# Patient Record
Sex: MALE | Race: WHITE | NOT HISPANIC OR LATINO | Employment: PART TIME | URBAN - NONMETROPOLITAN AREA
[De-identification: names, ages, dates, MRNs, and addresses within clinical notes are randomized per-mention and may not be internally consistent; named-entity substitution may affect disease eponyms.]

---

## 2019-05-23 ENCOUNTER — APPOINTMENT (OUTPATIENT)
Dept: OCCUPATIONAL MEDICINE | Facility: OTHER | Age: 34
End: 2019-05-23

## 2019-05-23 PROCEDURE — 99000 SPECIMEN HANDLING OFFICE-LAB: CPT

## 2021-11-17 ENCOUNTER — APPOINTMENT (OUTPATIENT)
Dept: OCCUPATIONAL MEDICINE | Facility: OTHER | Age: 36
End: 2021-11-17

## 2021-11-17 PROCEDURE — 99080 SPECIAL REPORTS OR FORMS: CPT

## 2021-11-17 PROCEDURE — 99000 SPECIMEN HANDLING OFFICE-LAB: CPT

## 2021-11-17 PROCEDURE — 99199 UNLISTED SPECIAL SVC PX/RPRT: CPT

## 2023-05-18 ENCOUNTER — HOSPITAL ENCOUNTER (EMERGENCY)
Facility: HOSPITAL | Age: 38
Discharge: HOME | End: 2023-05-18
Payer: COMMERCIAL

## 2023-05-18 VITALS
HEART RATE: 77 BPM | OXYGEN SATURATION: 96 % | TEMPERATURE: 97.2 F | RESPIRATION RATE: 18 BRPM | SYSTOLIC BLOOD PRESSURE: 153 MMHG | DIASTOLIC BLOOD PRESSURE: 76 MMHG | WEIGHT: 220 LBS

## 2023-05-18 DIAGNOSIS — Z02.89 ENCOUNTER TO OBTAIN EXCUSE FROM WORK: ICD-10-CM

## 2023-05-18 PROCEDURE — G0463 HOSPITAL OUTPT CLINIC VISIT: HCPCS

## 2023-05-18 PROCEDURE — 99213 OFFICE O/P EST LOW 20 MIN: CPT

## 2023-05-18 RX ORDER — SERTRALINE HYDROCHLORIDE 25 MG/1
1 TABLET, FILM COATED ORAL
COMMUNITY
Start: 2023-04-28

## 2023-05-18 ASSESSMENT — ENCOUNTER SYMPTOMS
FEVER: 0
COUGH: 0
APPETITE CHANGE: 0
DIZZINESS: 0
CHILLS: 0
HEADACHES: 0
CHEST TIGHTNESS: 0
ABDOMINAL PAIN: 0
VOMITING: 0
NAUSEA: 0
DIARRHEA: 0
SHORTNESS OF BREATH: 0
DYSURIA: 0
HEMATURIA: 0
ACTIVITY CHANGE: 0

## 2023-05-18 NOTE — Clinical Note
Figueroa Gill was seen and treated in our emergency department on 5/18/2023.  He may return to work on 05/22/2023.       If you have any questions or concerns, please don't hesitate to call.      Grace Lion, NP
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
## 2023-05-18 NOTE — DISCHARGE INSTRUCTIONS
You can take 1000mg of tylenol every 6 hours (max dose of 4000mg in 24 hours) and 600-800mg of ibuprofen every 8 hours. Make sure to eat with ibuprofen.     Please return with any chest pain, difficulty breathing, or concerns.
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
## 2023-05-23 ENCOUNTER — HOSPITAL ENCOUNTER (EMERGENCY)
Facility: HOSPITAL | Age: 38
Discharge: HOME | End: 2023-05-23
Attending: NURSE PRACTITIONER | Admitting: NURSE PRACTITIONER
Payer: COMMERCIAL

## 2023-05-23 ENCOUNTER — APPOINTMENT (OUTPATIENT)
Dept: GENERAL RADIOLOGY | Facility: HOSPITAL | Age: 38
End: 2023-05-23
Attending: NURSE PRACTITIONER
Payer: COMMERCIAL

## 2023-05-23 VITALS
WEIGHT: 242.06 LBS | SYSTOLIC BLOOD PRESSURE: 127 MMHG | DIASTOLIC BLOOD PRESSURE: 82 MMHG | HEART RATE: 62 BPM | TEMPERATURE: 97.3 F | RESPIRATION RATE: 16 BRPM | OXYGEN SATURATION: 97 %

## 2023-05-23 DIAGNOSIS — R07.9 CHEST PAIN OF UNKNOWN ETIOLOGY: Primary | ICD-10-CM

## 2023-05-23 PROCEDURE — 99213 OFFICE O/P EST LOW 20 MIN: CPT | Performed by: NURSE PRACTITIONER

## 2023-05-23 PROCEDURE — G0463 HOSPITAL OUTPT CLINIC VISIT: HCPCS | Mod: 25

## 2023-05-23 PROCEDURE — 71046 X-RAY EXAM CHEST 2 VIEWS: CPT

## 2023-05-23 RX ORDER — CYCLOBENZAPRINE HCL 10 MG
10 TABLET ORAL 2 TIMES DAILY PRN
Qty: 10 TABLET | Refills: 0 | Status: SHIPPED | OUTPATIENT
Start: 2023-05-23 | End: 2024-05-12

## 2023-05-23 ASSESSMENT — ENCOUNTER SYMPTOMS
FEVER: 0
SHORTNESS OF BREATH: 0
PSYCHIATRIC NEGATIVE: 1
VOMITING: 0
CHILLS: 0
NAUSEA: 0
DIARRHEA: 0
NECK STIFFNESS: 0
NECK PAIN: 0

## 2023-05-23 ASSESSMENT — ACTIVITIES OF DAILY LIVING (ADL): ADLS_ACUITY_SCORE: 35
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
## 2023-05-23 NOTE — Clinical Note
Figueroa Gill was seen and treated in our emergency department on 5/23/2023.  He may return to work on 05/26/2023.       If you have any questions or concerns, please don't hesitate to call.      Danielle Koch, NP
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
## 2023-05-23 NOTE — DISCHARGE INSTRUCTIONS
Establish care with a primary care provider and follow-up as needed  Alternate Tylenol and ibuprofen for pain  Cyclobenzaprine as needed for muscle spasms  Return to urgent care/ED with any worsening in condition or additional concerns.

## 2024-03-10 ENCOUNTER — APPOINTMENT (OUTPATIENT)
Dept: GENERAL RADIOLOGY | Facility: HOSPITAL | Age: 39
End: 2024-03-10
Attending: INTERNAL MEDICINE

## 2024-03-10 ENCOUNTER — HOSPITAL ENCOUNTER (EMERGENCY)
Facility: HOSPITAL | Age: 39
Discharge: HOME | End: 2024-03-10

## 2024-03-10 VITALS
SYSTOLIC BLOOD PRESSURE: 142 MMHG | TEMPERATURE: 98.3 F | RESPIRATION RATE: 18 BRPM | DIASTOLIC BLOOD PRESSURE: 78 MMHG | OXYGEN SATURATION: 97 % | HEART RATE: 97 BPM
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
## 2024-03-10 DIAGNOSIS — S63.502A WRIST SPRAIN, LEFT, INITIAL ENCOUNTER: ICD-10-CM

## 2024-03-10 PROCEDURE — 73110 X-RAY EXAM OF WRIST: CPT | Mod: LT

## 2024-03-10 PROCEDURE — 250N000013 HC RX MED GY IP 250 OP 250 PS 637: Performed by: INTERNAL MEDICINE

## 2024-03-10 PROCEDURE — 99283 EMERGENCY DEPT VISIT LOW MDM: CPT | Performed by: INTERNAL MEDICINE

## 2024-03-10 PROCEDURE — 73090 X-RAY EXAM OF FOREARM: CPT | Mod: LT

## 2024-03-10 PROCEDURE — 99283 EMERGENCY DEPT VISIT LOW MDM: CPT

## 2024-03-10 RX ORDER — NAPROXEN 500 MG/1
500 TABLET ORAL ONCE
Status: COMPLETED | OUTPATIENT
Start: 2024-03-10 | End: 2024-03-10

## 2024-03-10 RX ORDER — IBUPROFEN 800 MG/1
800 TABLET, FILM COATED ORAL EVERY 8 HOURS PRN
Qty: 15 TABLET | Refills: 0 | Status: SHIPPED | OUTPATIENT
Start: 2024-03-10 | End: 2024-03-15

## 2024-03-10 RX ORDER — OXYCODONE HYDROCHLORIDE 5 MG/1
5 TABLET ORAL ONCE
Status: COMPLETED | OUTPATIENT
Start: 2024-03-10 | End: 2024-03-10

## 2024-03-10 RX ADMIN — NAPROXEN 500 MG: 500 TABLET ORAL at 20:19

## 2024-03-10 RX ADMIN — OXYCODONE HYDROCHLORIDE 5 MG: 5 TABLET ORAL at 20:19

## 2024-03-10 ASSESSMENT — COLUMBIA-SUICIDE SEVERITY RATING SCALE - C-SSRS
6. HAVE YOU EVER DONE ANYTHING, STARTED TO DO ANYTHING, OR PREPARED TO DO ANYTHING TO END YOUR LIFE?: NO
2. HAVE YOU ACTUALLY HAD ANY THOUGHTS OF KILLING YOURSELF IN THE PAST MONTH?: NO
1. IN THE PAST MONTH, HAVE YOU WISHED YOU WERE DEAD OR WISHED YOU COULD GO TO SLEEP AND NOT WAKE UP?: NO

## 2024-03-10 ASSESSMENT — ACTIVITIES OF DAILY LIVING (ADL)
ADLS_ACUITY_SCORE: 35
ADLS_ACUITY_SCORE: 35

## 2024-03-11 NOTE — DISCHARGE INSTRUCTIONS
Body Location Override (Optional - Billing Will Still Be Based On Selected Body Map Location If Applicable): left nasal ala [facility]  [address] [place] [address] [place] [address]  [phone]   Detail Level: Detailed Add 96998 Cpt? (Important Note: In 2017 The Use Of 26768 Is Being Tracked By Cms To Determine Future Global Period Reimbursement For Global Periods): yes
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
## 2024-03-11 NOTE — ED NOTES
Patient placed in wrist splint and sling. Patient is discharging to home given information on wrist sprain follow up with [facility] as needed . Patient stated understanding of these instructions and is discharging by private auto.
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
## 2024-03-24 ASSESSMENT — ENCOUNTER SYMPTOMS
WHEEZING: 0
COUGH: 0
CHILLS: 0
ANAL BLEEDING: 0
SLEEP DISTURBANCE: 0
LOSS OF CONSCIOUSNESS: 0
CHEST TIGHTNESS: 0
NECK PAIN: 0
CONFUSION: 0
COLOR CHANGE: 0
FLANK PAIN: 0
VOMITING: 0
NUMBNESS: 0
DIAPHORESIS: 0
LIGHT-HEADEDNESS: 0
WEAKNESS: 0
NAUSEA: 0
DYSURIA: 0
ABDOMINAL DISTENTION: 0
FREQUENCY: 0
HEADACHES: 0
FEVER: 0
MYALGIAS: 0
BLOOD IN STOOL: 0
VOICE CHANGE: 0
PALPITATIONS: 0
BACK PAIN: 0
SHORTNESS OF BREATH: 0
DIZZINESS: 0
ABDOMINAL PAIN: 0
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
## 2024-04-23 ENCOUNTER — HOSPITAL ENCOUNTER (EMERGENCY)
Facility: HOSPITAL | Age: 39
Discharge: HOME | End: 2024-04-23

## 2024-04-23 VITALS
TEMPERATURE: 97.9 F | HEIGHT: 70 IN | WEIGHT: 240 LBS | SYSTOLIC BLOOD PRESSURE: 128 MMHG | DIASTOLIC BLOOD PRESSURE: 82 MMHG | RESPIRATION RATE: 18 BRPM | BODY MASS INDEX: 34.36 KG/M2 | OXYGEN SATURATION: 97 % | HEART RATE: 89 BPM

## 2024-04-23 DIAGNOSIS — J02.9 PHARYNGITIS: ICD-10-CM

## 2024-04-23 DIAGNOSIS — J32.9 SINUSITIS: ICD-10-CM

## 2024-04-23 LAB
FLUAV RNA SPEC QL NAA+PROBE: NEGATIVE
FLUBV RNA RESP QL NAA+PROBE: NEGATIVE
GROUP A STREP BY PCR: NOT DETECTED
RSV RNA SPEC NAA+PROBE: NEGATIVE
SARS-COV-2 RNA RESP QL NAA+PROBE: NEGATIVE

## 2024-04-23 PROCEDURE — 87637 SARSCOV2&INF A&B&RSV AMP PRB: CPT | Performed by: STUDENT IN AN ORGANIZED HEALTH CARE EDUCATION/TRAINING PROGRAM

## 2024-04-23 PROCEDURE — G0463 HOSPITAL OUTPT CLINIC VISIT: HCPCS

## 2024-04-23 PROCEDURE — 87651 STREP A DNA AMP PROBE: CPT | Performed by: STUDENT IN AN ORGANIZED HEALTH CARE EDUCATION/TRAINING PROGRAM

## 2024-04-23 PROCEDURE — 99213 OFFICE O/P EST LOW 20 MIN: CPT

## 2024-04-23 RX ORDER — FLUTICASONE PROPIONATE 50 MCG
1 SPRAY, SUSPENSION (ML) NASAL DAILY
Qty: 16 G | Refills: 0 | Status: SHIPPED | OUTPATIENT
Start: 2024-04-23 | End: 2024-04-28

## 2024-04-23 RX ORDER — IBUPROFEN 200 MG
200 TABLET ORAL EVERY 4 HOURS PRN
COMMUNITY

## 2024-04-23 ASSESSMENT — ENCOUNTER SYMPTOMS
DIARRHEA: 0
COUGH: 1
ACTIVITY CHANGE: 0
SINUS PAIN: 1
ABDOMINAL PAIN: 0
FATIGUE: 1
SINUS PRESSURE: 1
CHILLS: 1
VOMITING: 0
SORE THROAT: 1
NAUSEA: 0
APPETITE CHANGE: 0
FEVER: 0

## 2024-04-23 ASSESSMENT — COLUMBIA-SUICIDE SEVERITY RATING SCALE - C-SSRS
1. IN THE PAST MONTH, HAVE YOU WISHED YOU WERE DEAD OR WISHED YOU COULD GO TO SLEEP AND NOT WAKE UP?: NO
6. HAVE YOU EVER DONE ANYTHING, STARTED TO DO ANYTHING, OR PREPARED TO DO ANYTHING TO END YOUR LIFE?: NO
2. HAVE YOU ACTUALLY HAD ANY THOUGHTS OF KILLING YOURSELF IN THE PAST MONTH?: NO

## 2024-04-23 NOTE — ED PROVIDER NOTES
"  History     Chief Complaint   Patient presents with    Pharyngitis    Fever     HPI  Figueroa Gill is a 39 year old male who presents to the urgent care with a 6 day history of sinus pressure, congestion, sore throat, right ear pain, and chills. He denies fevers, abd pain, chest pain, shortness of breath, and n/v/d. No recent abx. Has been taking ibuprofen with minimal change, none today. Multiple ill contacts in home with similar symptoms.     Allergies:  No Known Allergies    Problem List:    There are no problems to display for this patient.       Past Medical History:    No past medical history on file.    Past Surgical History:    No past surgical history on file.    Family History:    No family history on file.    Social History:  Marital Status:  Single [1]  Social History     Tobacco Use    Smoking status: Every Day     Current packs/day: 0.50     Average packs/day: 0.5 packs/day for 15.0 years (7.5 ttl pk-yrs)     Types: Cigarettes    Smokeless tobacco: Never   Substance Use Topics    Alcohol use: No    Drug use: No        Medications:    acetaminophen-codeine (TYLENOL/CODEINE #3) 300-30 MG per tablet  cyclobenzaprine (FLEXERIL) 10 MG tablet  fluticasone (FLONASE) 50 MCG/ACT nasal spray  ibuprofen (ADVIL/MOTRIN) 200 MG tablet  sertraline (ZOLOFT) 25 MG tablet  traMADol (ULTRAM) 50 MG tablet          Review of Systems   Constitutional:  Positive for chills and fatigue. Negative for activity change, appetite change and fever.   HENT:  Positive for congestion, ear pain (right), sinus pressure, sinus pain and sore throat.    Respiratory:  Positive for cough.    Gastrointestinal:  Negative for abdominal pain, diarrhea, nausea and vomiting.   All other systems reviewed and are negative.      Physical Exam   BP: 128/82  Pulse: 89  Temp: 97.9  F (36.6  C)  Resp: 18  Height: 177.8 cm (5' 10\")  Weight: 108.9 kg (240 lb)  SpO2: 97 %      Physical Exam  Vitals and nursing note reviewed.   Constitutional:       " General: He is not in acute distress.     Appearance: He is well-developed. He is ill-appearing. He is not toxic-appearing or diaphoretic.   HENT:      Right Ear: A middle ear effusion (non purulent) is present. Tympanic membrane is not erythematous.      Left Ear: A middle ear effusion (non purulent) is present. Tympanic membrane is not erythematous.      Mouth/Throat:      Pharynx: No posterior oropharyngeal erythema.   Cardiovascular:      Rate and Rhythm: Normal rate and regular rhythm.      Heart sounds: Normal heart sounds. No murmur heard.  Pulmonary:      Effort: Pulmonary effort is normal.      Breath sounds: Normal breath sounds. No wheezing, rhonchi or rales.   Lymphadenopathy:      Cervical: Cervical adenopathy present.   Skin:     General: Skin is warm and dry.   Neurological:      Mental Status: He is alert.         ED Course        Procedures         Results for orders placed or performed during the hospital encounter of 04/23/24 (from the past 24 hour(s))   Symptomatic Influenza A/B, RSV, & SARS-CoV2 PCR (COVID-19) Nasopharyngeal    Specimen: Nasopharyngeal; Swab   Result Value Ref Range    Influenza A PCR Negative Negative    Influenza B PCR Negative Negative    RSV PCR Negative Negative    SARS CoV2 PCR Negative Negative    Narrative    Testing was performed using the Xpert Xpress CoV2/Flu/RSV Assay on the [employer] GeneXpert Instrument. This test should be ordered for the detection of SARS-CoV-2, influenza, and RSV viruses in individuals who meet clinical and/or epidemiological criteria. Test performance is unknown in asymptomatic patients. This test is for in vitro diagnostic use under the FDA EUA for laboratories certified under CLIA to perform high or moderate complexity testing. This test has not been FDA cleared or approved. A negative result does not rule out the presence of PCR inhibitors in the specimen or target RNA in concentration below the limit of detection for the assay. If only one  viral target is positive but coinfection with multiple targets is suspected, the sample should be re-tested with another FDA cleared, approved, or authorized test, if coinfection would change clinical management. This test was validated by the [facility]. These laboratories are certified under the Clinical Laboratory Improvement Amendments of 1988 (CLIA-88) as qualified to perform high complexity laboratory testing.   Group A Streptococcus PCR Throat Swab    Specimen: Throat; Swab   Result Value Ref Range    Group A strep by PCR Not Detected Not Detected    Narrative    The Xpert Xpress Strep A test, performed on the [employer], is a rapid, qualitative in vitro diagnostic test for the detection of Streptococcus pyogenes (Group A ß-hemolytic Streptococcus, Strep A) in throat swab specimens from patients with signs and symptoms of pharyngitis. The Xpert Xpress Strep A test can be used as an aid in the diagnosis of Group A Streptococcal pharyngitis. The assay is not intended to monitor treatment for Group A Streptococcus infections. The Xpert Xpress Strep A test utilizes an automated real-time polymerase chain reaction (PCR) to detect Streptococcus pyogenes DNA.       Medications - No data to display    Assessments & Plan (with Medical Decision Making)     I have reviewed the nursing notes.    I have reviewed the findings, diagnosis, plan and need for follow up with the patient.  Figueroa Gill is a 39 year old male who presents to the urgent care with a 6 day history of sinus pressure, congestion, sore throat, right ear pain, and chills. He denies fevers, abd pain, chest pain, shortness of breath, and n/v/d. No recent abx. Has been taking ibuprofen with minimal change, none today. Multiple ill contacts in home with similar symptoms.     MDM: vital signs normal, afebrile. Lungs clear, heart tones regular. He is ill appearing but non toxic in appearance. Given ill contacts and  length of illness, most likely viral in nature. Strep, covid, influenza, and RSV negative. Viral and bacterial etiologies discussed. Flonase prescribed. Discussed sudafed to help with congestion. Supportive measures and return precautions discussed. He is in agreement with plan.     (J32.9) Sinusitis, (J02.9) Pharyngitis  Plan: Flonase nasal steroid once daily for 5 days. Prescription sent to [facility] in [place].   Sudafed 60mg every 6 hours as needed to help with congestion. You can purchase this over the counter from the pharmacy.   Tylenol and ibuprofen as needed.   Push fluids.   Return with any chest pain, shortness of breath, or other concerns. Understanding verbalized.       Discharge Medication List as of 4/23/2024  9:30 AM        START taking these medications    Details   fluticasone (FLONASE) 50 MCG/ACT nasal spray Spray 1 spray into both nostrils daily for 5 days, Disp-16 g, R-0, E-Prescribe             Final diagnoses:   Sinusitis   Pharyngitis       4/23/2024   [facility]       Grace Lion NP  04/23/24 2955

## 2024-04-23 NOTE — Clinical Note
Figueroa Gill was seen and treated in our emergency department on 4/23/2024.  He may return to work on 04/24/2024.  Figueroa was in the UC today.     If you have any questions or concerns, please don't hesitate to call.      Grace Lion, NP
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
## 2024-04-23 NOTE — DISCHARGE INSTRUCTIONS
Flonase nasal steroid once daily for 5 days. Prescription sent to [facility] in [place].   Sudafed 60mg every 6 hours as needed to help with congestion. You can purchase this over the counter from the pharmacy.   Tylenol and ibuprofen as needed.   Push fluids.   Return with any chest pain, shortness of breath, or other concerns.

## 2024-05-12 ENCOUNTER — HOSPITAL ENCOUNTER (EMERGENCY)
Facility: HOSPITAL | Age: 39
Discharge: HOME | End: 2024-05-12
Attending: EMERGENCY MEDICINE | Admitting: EMERGENCY MEDICINE

## 2024-05-12 VITALS
HEART RATE: 86 BPM | OXYGEN SATURATION: 98 % | SYSTOLIC BLOOD PRESSURE: 152 MMHG | RESPIRATION RATE: 18 BRPM | TEMPERATURE: 98.9 F | DIASTOLIC BLOOD PRESSURE: 86 MMHG

## 2024-05-12 DIAGNOSIS — M25.469 TRAUMATIC EFFUSION OF KNEE JOINT: ICD-10-CM

## 2024-05-12 PROCEDURE — 250N000013 HC RX MED GY IP 250 OP 250 PS 637: Performed by: EMERGENCY MEDICINE

## 2024-05-12 PROCEDURE — 99283 EMERGENCY DEPT VISIT LOW MDM: CPT | Performed by: EMERGENCY MEDICINE

## 2024-05-12 PROCEDURE — 99283 EMERGENCY DEPT VISIT LOW MDM: CPT

## 2024-05-12 RX ORDER — CYCLOBENZAPRINE HCL 10 MG
10 TABLET ORAL 3 TIMES DAILY PRN
Qty: 15 TABLET | Refills: 0 | Status: SHIPPED | OUTPATIENT
Start: 2024-05-12

## 2024-05-12 RX ORDER — IBUPROFEN 600 MG/1
600 TABLET, FILM COATED ORAL EVERY 6 HOURS PRN
Qty: 30 TABLET | Refills: 0 | Status: SHIPPED | OUTPATIENT
Start: 2024-05-12

## 2024-05-12 RX ORDER — OXYCODONE HYDROCHLORIDE 5 MG/1
5 TABLET ORAL ONCE
Status: COMPLETED | OUTPATIENT
Start: 2024-05-12 | End: 2024-05-12

## 2024-05-12 RX ADMIN — OXYCODONE HYDROCHLORIDE 5 MG: 5 TABLET ORAL at 09:34

## 2024-05-12 ASSESSMENT — ENCOUNTER SYMPTOMS
SEIZURES: 0
ARTHRALGIAS: 1
CHILLS: 0
FEVER: 0

## 2024-05-12 ASSESSMENT — ACTIVITIES OF DAILY LIVING (ADL): ADLS_ACUITY_SCORE: 33
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
## 2024-05-12 NOTE — ED NOTES
AVS reviewed. All questions and concerns answered. Education reviewed, pt states no further questions at this time.
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
## 2024-05-12 NOTE — ED NOTES
AVS reviewed. All questions and concerns answered. Education reviewed, pt states no further questions at this time.\

## 2025-03-15 ENCOUNTER — HOSPITAL ENCOUNTER (EMERGENCY)
Facility: HOSPITAL | Age: 40
Discharge: HOME | End: 2025-03-15
Attending: NURSE PRACTITIONER
Payer: COMMERCIAL

## 2025-03-15 VITALS
SYSTOLIC BLOOD PRESSURE: 138 MMHG | OXYGEN SATURATION: 98 % | HEART RATE: 67 BPM | TEMPERATURE: 97.4 F | RESPIRATION RATE: 16 BRPM | DIASTOLIC BLOOD PRESSURE: 83 MMHG

## 2025-03-15 DIAGNOSIS — K08.89 PAIN, DENTAL: Primary | ICD-10-CM

## 2025-03-15 PROCEDURE — G0463 HOSPITAL OUTPT CLINIC VISIT: HCPCS

## 2025-03-15 PROCEDURE — 99213 OFFICE O/P EST LOW 20 MIN: CPT | Performed by: NURSE PRACTITIONER

## 2025-03-15 RX ORDER — TRAZODONE HYDROCHLORIDE 50 MG/1
TABLET ORAL
COMMUNITY
Start: 2024-06-17

## 2025-03-15 RX ORDER — CHLORHEXIDINE GLUCONATE ORAL RINSE 1.2 MG/ML
15 SOLUTION DENTAL 2 TIMES DAILY
Qty: 210 ML | Refills: 0 | Status: SHIPPED | OUTPATIENT
Start: 2025-03-15 | End: 2025-03-22

## 2025-03-15 ASSESSMENT — ENCOUNTER SYMPTOMS
PSYCHIATRIC NEGATIVE: 1
FEVER: 0
NECK PAIN: 0
SHORTNESS OF BREATH: 0
CHILLS: 0
TROUBLE SWALLOWING: 0
DIARRHEA: 0
VOMITING: 0
NECK STIFFNESS: 0
NAUSEA: 0
FACIAL SWELLING: 1

## 2025-03-15 ASSESSMENT — ACTIVITIES OF DAILY LIVING (ADL): ADLS_ACUITY_SCORE: 41

## 2025-03-15 NOTE — DISCHARGE INSTRUCTIONS
Augmentin as ordered  - Take entire course of antibiotic even if you start to feel better.  - Antibiotics can cause stomach upset including nausea and diarrhea. Read your bottle or ask the pharmacist if antibiotic can be taken with food to help prevent nausea. If you have symptoms of diarrhea you can take an over-the-counter probiotic and/or increase foods with probiotics such as yogurt, Aquilla, sauerkraut.    Peridex mouthwash as ordered    Alternate tylenol and ibuprofen as needed for pain    Follow up with a dentist for further evaluation    Follow-up with primary care provider or return to urgent care/ED with any worsening in condition or additional concerns.

## 2025-03-15 NOTE — ED PROVIDER NOTES
History     Chief Complaint   Patient presents with    Dental Pain     HPI  Figueroa Gill is a 39 year old male who presents to urgent care today ambulatory with complaints of dental pain to tooth #28 which started this morning.  Patient states that a portion of tooth broke off approximately 2 weeks ago.  Mild facial swelling.  No trismus.  No difficulty swallowing.  No neck pain or stiffness.  Denies any fever, chills, nausea, vomiting, diarrhea, shortness of breath or chest pain.  Does not have an established dentist.  Took ibuprofen this morning for pain.  No other concerns.    Allergies:  No Known Allergies    Problem List:    There are no active problems to display for this patient.       Past Medical History:    No past medical history on file.    Past Surgical History:    No past surgical history on file.    Family History:    No family history on file.    Social History:  Marital Status:  Single [1]  Social History     Tobacco Use    Smoking status: Every Day     Current packs/day: 0.50     Average packs/day: 0.5 packs/day for 15.0 years (7.5 ttl pk-yrs)     Types: Cigarettes    Smokeless tobacco: Never   Substance Use Topics    Alcohol use: No    Drug use: No        Medications:    amoxicillin-clavulanate (AUGMENTIN) 875-125 MG tablet  chlorhexidine (PERIDEX) 0.12 % solution  traZODone (DESYREL) 50 MG tablet  acetaminophen-codeine (TYLENOL/CODEINE #3) 300-30 MG per tablet  cyclobenzaprine (FLEXERIL) 10 MG tablet  ibuprofen (ADVIL/MOTRIN) 200 MG tablet  ibuprofen (ADVIL/MOTRIN) 600 MG tablet  medical cannabis (Patient's own supply)  sertraline (ZOLOFT) 25 MG tablet  traMADol (ULTRAM) 50 MG tablet      Review of Systems   Constitutional:  Negative for chills and fever.   HENT:  Positive for dental problem and facial swelling. Negative for trouble swallowing.    Respiratory:  Negative for shortness of breath.    Cardiovascular:  Negative for chest pain.   Gastrointestinal:  Negative for diarrhea, nausea and  vomiting.   Musculoskeletal:  Negative for gait problem, neck pain and neck stiffness.   Psychiatric/Behavioral: Negative.       Physical Exam   BP: 138/83  Pulse: 67  Temp: 97.4  F (36.3  C)  Resp: 16  SpO2: 98 %    Physical Exam  Vitals and nursing note reviewed.   Constitutional:       General: He is not in acute distress.     Appearance: Normal appearance. He is not ill-appearing or toxic-appearing.   HENT:      Head:      Jaw: There is normal jaw occlusion.      Right Ear: Tympanic membrane, ear canal and external ear normal.      Left Ear: Tympanic membrane, ear canal and external ear normal.      Nose: Nose normal.      Mouth/Throat:      Mouth: Mucous membranes are moist.      Dentition: Abnormal dentition. Dental tenderness and gingival swelling present. No dental abscesses.      Pharynx: Oropharynx is clear.      Comments: Overall dentition fair.  Dental pain to tooth #28, small portion of tooth gone.  Tenderness on palpation.  Mild right-sided facial swelling.  No obvious abscess.  No trismus.  No difficulty swallowing.  No neck pain or stiffness.  Cardiovascular:      Rate and Rhythm: Normal rate and regular rhythm.      Pulses: Normal pulses.      Heart sounds: Normal heart sounds.   Pulmonary:      Effort: Pulmonary effort is normal.      Breath sounds: Normal breath sounds.   Musculoskeletal:      Cervical back: Normal range of motion and neck supple. No rigidity or tenderness.   Lymphadenopathy:      Cervical: No cervical adenopathy.   Neurological:      Mental Status: He is alert.   Psychiatric:         Mood and Affect: Mood normal.       ED Course     Procedures    No results found for this or any previous visit (from the past 24 hours).    Medications - No data to display    Assessments & Plan (with Medical Decision Making)     I have reviewed the nursing notes.    I have reviewed the findings, diagnosis, plan and need for follow up with the patient.  (K08.89) Pain, dental  (primary encounter  diagnosis)  Plan:   Patient ambulatory with a nontoxic appearance.  Overall dentition fair.  Dental pain to tooth #28, small portion of tooth gone.  Tenderness on palpation.  Mild right-sided facial swelling.  No obvious abscess.  No trismus.  No difficulty swallowing.  No neck pain or stiffness.  Denies any fever, chills, nausea, vomiting, diarrhea, shortness of breath or chest pain.  Does not have an established dentist  Will start patient on Augmentin and Peridex mouthwash.  Recommend alternating Tylenol and ibuprofen as needed for pain.  Follow-up with a dentist for further evaluation.  Follow-up with primary care provider or return to urgent care/ED with any worsening in condition or additional concerns.  Patient in agreement treatment plan.    Final diagnoses:   Pain, dental     3/15/2025   [facility]       Danielle Koch NP  03/15/25 1214

## 2025-03-15 NOTE — ED TRIAGE NOTES
Pt presents with c/o right lower dental pain. Sx started this morning/last night. Denies having a primary dentist. Pt took ibuprofen.

## 2025-06-09 ENCOUNTER — HOSPITAL ENCOUNTER (EMERGENCY)
Facility: HOSPITAL | Age: 40
Discharge: HOME | End: 2025-06-09
Attending: FAMILY MEDICINE
Payer: OTHER MISCELLANEOUS

## 2025-06-09 VITALS
OXYGEN SATURATION: 99 % | TEMPERATURE: 97.5 F | RESPIRATION RATE: 24 BRPM | SYSTOLIC BLOOD PRESSURE: 107 MMHG | DIASTOLIC BLOOD PRESSURE: 69 MMHG | HEART RATE: 76 BPM

## 2025-06-09 DIAGNOSIS — S61.211A LACERATION OF LEFT INDEX FINGER WITHOUT FOREIGN BODY WITHOUT DAMAGE TO NAIL, INITIAL ENCOUNTER: ICD-10-CM

## 2025-06-09 PROCEDURE — 12001 RPR S/N/AX/GEN/TRNK 2.5CM/<: CPT

## 2025-06-09 PROCEDURE — 250N000011 HC RX IP 250 OP 636: Performed by: FAMILY MEDICINE

## 2025-06-09 PROCEDURE — 99283 EMERGENCY DEPT VISIT LOW MDM: CPT | Mod: 25

## 2025-06-09 PROCEDURE — 90471 IMMUNIZATION ADMIN: CPT | Performed by: FAMILY MEDICINE

## 2025-06-09 PROCEDURE — 12001 RPR S/N/AX/GEN/TRNK 2.5CM/<: CPT | Performed by: FAMILY MEDICINE

## 2025-06-09 PROCEDURE — 90715 TDAP VACCINE 7 YRS/> IM: CPT | Performed by: FAMILY MEDICINE

## 2025-06-09 RX ORDER — BUPIVACAINE HYDROCHLORIDE 5 MG/ML
10 INJECTION, SOLUTION EPIDURAL; INTRACAUDAL; PERINEURAL ONCE
Status: COMPLETED | OUTPATIENT
Start: 2025-06-09 | End: 2025-06-09

## 2025-06-09 RX ADMIN — BUPIVACAINE HYDROCHLORIDE 50 MG: 5 INJECTION, SOLUTION EPIDURAL; INTRACAUDAL; PERINEURAL at 12:29

## 2025-06-09 RX ADMIN — CLOSTRIDIUM TETANI TOXOID ANTIGEN (FORMALDEHYDE INACTIVATED), CORYNEBACTERIUM DIPHTHERIAE TOXOID ANTIGEN (FORMALDEHYDE INACTIVATED), BORDETELLA PERTUSSIS TOXOID ANTIGEN (GLUTARALDEHYDE INACTIVATED), BORDETELLA PERTUSSIS FILAMENTOUS HEMAGGLUTININ ANTIGEN (FORMALDEHYDE INACTIVATED), BORDETELLA PERTUSSIS PERTACTIN ANTIGEN, AND BORDETELLA PERTUSSIS FIMBRIAE 2/3 ANTIGEN 0.5 ML: 5; 2; 2.5; 5; 3; 5 INJECTION, SUSPENSION INTRAMUSCULAR at 12:16

## 2025-06-09 ASSESSMENT — ENCOUNTER SYMPTOMS
COUGH: 0
FEVER: 0
FOCAL WEAKNESS: 0
DYSURIA: 0
ACTIVITY CHANGE: 0
EYE REDNESS: 0
HEMATURIA: 0
VOMITING: 0
ABDOMINAL PAIN: 0
WOUND: 1
NUMBNESS: 0
ARTHRALGIAS: 0
RHINORRHEA: 0
CHILLS: 0
NECK STIFFNESS: 0
FATIGUE: 0
APPETITE CHANGE: 0
MYALGIAS: 0
HEADACHES: 0
WEAKNESS: 0
EXTREMITY NUMBNESS: 0
NAUSEA: 0
SHORTNESS OF BREATH: 0
SORE THROAT: 0
DIARRHEA: 0
DIZZINESS: 0

## 2025-06-09 ASSESSMENT — ACTIVITIES OF DAILY LIVING (ADL)
ADLS_ACUITY_SCORE: 41

## 2025-06-09 NOTE — ED PROVIDER NOTES
History     Chief Complaint   Patient presents with    Laceration     The history is provided by the patient and medical records.   Laceration  Location:  Finger  Finger laceration location:  L index finger  Quality: straight    Bleeding: venous and controlled with pressure    Time since incident:  30 minutes  Laceration mechanism:  Metal edge  Pain details:     Quality:  Aching and burning    Severity:  Severe    Timing:  Constant    Progression:  Unchanged  Relieved by:  Pressure  Worsened by:  Movement  Tetanus status:  Out of date  Associated symptoms: no fever, no focal weakness, no numbness, no rash, no redness, no swelling and no streaking          Allergies:  No Known Allergies    Problem List:    There are no active problems to display for this patient.       Past Medical History:    History reviewed. No pertinent past medical history.    Past Surgical History:    History reviewed. No pertinent surgical history.    Family History:    History reviewed. No pertinent family history.    Social History:  Marital Status:  Single [1]  Social History     Tobacco Use    Smoking status: Every Day     Current packs/day: 0.50     Average packs/day: 0.5 packs/day for 15.0 years (7.5 ttl pk-yrs)     Types: Cigarettes    Smokeless tobacco: Never   Substance Use Topics    Alcohol use: No    Drug use: No        Medications:    acetaminophen-codeine (TYLENOL/CODEINE #3) 300-30 MG per tablet  cyclobenzaprine (FLEXERIL) 10 MG tablet  ibuprofen (ADVIL/MOTRIN) 200 MG tablet  ibuprofen (ADVIL/MOTRIN) 600 MG tablet  medical cannabis (Patient's own supply)  sertraline (ZOLOFT) 25 MG tablet  traMADol (ULTRAM) 50 MG tablet  traZODone (DESYREL) 50 MG tablet          Review of Systems   Constitutional:  Negative for activity change, appetite change, chills, fatigue and fever.   HENT:  Negative for congestion, rhinorrhea and sore throat.    Eyes:  Negative for redness.   Respiratory:  Negative for cough and shortness of breath.     Cardiovascular:  Negative for chest pain.   Gastrointestinal:  Negative for abdominal pain, diarrhea, nausea and vomiting.   Genitourinary:  Negative for dysuria and hematuria.   Musculoskeletal:  Negative for arthralgias, myalgias and neck stiffness.   Skin:  Positive for wound. Negative for rash.   Neurological:  Negative for dizziness, focal weakness, weakness, numbness and headaches.       Physical Exam   BP: 107/69  Pulse: 76  Temp: 97.5  F (36.4  C)  Resp: 24  SpO2: 99 %      Physical Exam  Vitals and nursing note reviewed.   Constitutional:       Appearance: Normal appearance.   HENT:      Head: Normocephalic and atraumatic.   Cardiovascular:      Rate and Rhythm: Normal rate and regular rhythm.      Pulses: Normal pulses.      Heart sounds: Normal heart sounds.   Pulmonary:      Effort: Pulmonary effort is normal.      Breath sounds: Normal breath sounds.   Abdominal:      General: Abdomen is flat. Bowel sounds are normal.   Musculoskeletal:         General: Normal range of motion.      Right hand: Normal.      Left hand: Laceration (2x0.5 cm transverse laceration PIP joint anterior) and tenderness present. No bony tenderness. Normal range of motion.        Hands:    Skin:     General: Skin is warm.   Neurological:      Mental Status: He is alert.         ED Course     ED Course as of 06/09/25 1429   Mon Jun 09, 2025   1233 Digital block placed with Marcaine.     [facility]    Digital Block    Date/Time: 6/9/2025 12:49 PM    Performed by: Thiago Coyle MD  Authorized by: Thiago Coyle MD    Emergent condition/consent implied      UNIVERSAL PROTOCOL     Prior Images Obtained and Reviewed:  NA  Patient identity confirmed:  Verbally with patient  NA - No sedation, light sedation, or local anesthesia  Confirmation Checklist:  Patient's identity using two indicators, relevant allergies, procedure was appropriate and matched the consent or emergent situation and correct  equipment/implants were available  Time out: Immediately prior to the procedure a time out was called    Universal Protocol: the Joint Commission Universal Protocol was followed    Preparation: Patient was prepped and draped in usual sterile fashion      INDICATION     Indications:  Pain relief    LOCATION     Block location:  Finger    Finger blocked:  L index finger    PRE-PROCEDURE DETAILS     Neurovascular status: intact      Skin preparation:  Alcohol    ANESTHESIA (see MAR for exact dosages)     Syringe type:  Luer lock syringe    Needle gauge:  27 G    Anesthetic injected:  Bupivacaine 0.5% w/o epi    Technique:  Three-sided block    Injection procedure:  Anatomic landmarks palpated, incremental injection and introduced needle    POST-PROCEDURE DETAILS     Outcome:  Anesthesia achieved      PROCEDURE    Patient Tolerance:  Patient tolerated the procedure well with no immediate complications  [facility]    -Laceration Repair    Date/Time: 6/9/2025 12:53 PM    Performed by: Thiago Coyle MD  Authorized by: Thiago Coyle MD    Emergent condition/consent implied      ANESTHESIA (see MAR for exact dosages):     Anesthesia method: digital block.  LACERATION DETAILS     Location:  Finger    Finger location:  L index finger    Length (cm):  2    Depth (mm):  5    REPAIR TYPE:     Repair type:  Simple    EXPLORATION:     Hemostasis achieved with:  Direct pressure    Wound exploration: wound explored through full range of motion and entire depth of wound probed and visualized      Wound extent: fascia not violated, no foreign body, no signs of injury, no nerve damage, no tendon damage, no underlying fracture and no vascular damage      Contaminated: yes      TREATMENT:     Area cleansed with:  Soap and water    Amount of cleaning:  Standard    Irrigation solution:  Sterile water    Irrigation method:  Syringe    Foreign body removal: None seen.      SKIN REPAIR     Repair method:   Sutures    Suture size:  5-0    Suture material:  Nylon    Suture technique:  Simple interrupted    Number of sutures:  7    APPROXIMATION     Approximation:  Close    POST-PROCEDURE DETAILS     Dressing:  Bulky dressing and non-adherent dressing      PROCEDURE    Patient Tolerance:  Patient tolerated the procedure well with no immediate complications                  No results found for this or any previous visit (from the past 24 hours).    Medications   BUPivacaine (MARCAINE) 0.5% preservative free injection (50 mg Other $Given 6/9/25 1229)   Tdap (tetanus-diphtheria-acell pertussis) (ADACEL) injection 0.5 mL (0.5 mLs Intramuscular $Given 6/9/25 1216)       Assessments & Plan (with Medical Decision Making)     I have reviewed the nursing notes.    I have reviewed the findings, diagnosis, plan and need for follow up with the patient.  The patient is a 39 y/o male who presents to the ED with a laceration to the left index finger.  He had full sensation and full range of motion.  Please see the procedure note for details regarding the repair.  Plan to have him follow-up with the primary medical provider in 10 to 14 days for suture removal.  Care instructions were discussed.  Symptomatic therapies were reviewed.  Reasons to return to the emergency department discussed in detail.        Medical Decision Making  Differential diagnosis: Laceration with or without infection and with or without foreign body.        Discharge Medication List as of 6/9/2025  2:01 PM          Final diagnoses:   Laceration of left index finger without foreign body without damage to nail, initial encounter       6/9/2025   [facility]       Thiago Coyle MD  06/09/25 5031

## 2025-06-09 NOTE — Clinical Note
Figueroa Gill was seen and treated in our emergency department on 6/9/2025.  He may return to work on 06/12/2025.  Restrictions: no activity with the left upper extremity for 14 days.  He will need to have his sutures out at that time.       If you have any questions or concerns, please don't hesitate to call.      Thiago Coyle MD

## 2025-06-09 NOTE — ED TRIAGE NOTES
Laceration to left index finger from stainless steel plate     Triage Assessment (Adult)       Row Name 06/09/25 1210          Triage Assessment    Airway WDL WDL        Respiratory WDL    Respiratory WDL WDL        Skin Circulation/Temperature WDL    Skin Circulation/Temperature WDL WDL        Peripheral/Neurovascular WDL    Peripheral Neurovascular WDL WDL        Cognitive/Neuro/Behavioral WDL    Cognitive/Neuro/Behavioral WDL all

## 2025-06-19 ENCOUNTER — TELEPHONE (OUTPATIENT)
Dept: EMERGENCY MEDICINE | Facility: HOSPITAL | Age: 40
End: 2025-06-19

## 2025-06-19 NOTE — ED NOTES
Care Transitions focused note:      Follow up call on patient who needs follow up for suture removal and likely a PCP.    Call attempt to patient, no answer, No voicemail set up.    Unable to follow up at this time    JOSEPHINE Miranda

## 2025-06-21 ENCOUNTER — HOSPITAL ENCOUNTER (EMERGENCY)
Facility: HOSPITAL | Age: 40
Discharge: HOME | End: 2025-06-21

## 2025-06-21 VITALS
SYSTOLIC BLOOD PRESSURE: 126 MMHG | OXYGEN SATURATION: 95 % | DIASTOLIC BLOOD PRESSURE: 82 MMHG | RESPIRATION RATE: 18 BRPM | HEART RATE: 100 BPM | WEIGHT: 240 LBS | BODY MASS INDEX: 34.44 KG/M2 | TEMPERATURE: 97.2 F

## 2025-06-21 DIAGNOSIS — Z48.02 VISIT FOR SUTURE REMOVAL: ICD-10-CM

## 2025-06-21 PROCEDURE — 999N000104 HC STATISTIC NO CHARGE

## 2025-06-21 NOTE — DISCHARGE INSTRUCTIONS
Return with any new or concerning symptoms. Sutures removed without difficulty. Wound has healed well.

## 2025-06-21 NOTE — ED PROVIDER NOTES
Seven sutures to left index finger placed 6/9. No signs of infection. CMS intact. Sutures removed without difficulty. Wound is well healed.      Grace Lion, NP  06/21/25 9000